# Patient Record
Sex: MALE | Race: WHITE | NOT HISPANIC OR LATINO
[De-identification: names, ages, dates, MRNs, and addresses within clinical notes are randomized per-mention and may not be internally consistent; named-entity substitution may affect disease eponyms.]

---

## 2020-09-15 PROBLEM — Z00.00 ENCOUNTER FOR PREVENTIVE HEALTH EXAMINATION: Status: ACTIVE | Noted: 2020-09-15

## 2020-09-16 ENCOUNTER — APPOINTMENT (OUTPATIENT)
Dept: GASTROENTEROLOGY | Facility: CLINIC | Age: 50
End: 2020-09-16
Payer: MEDICAID

## 2020-09-16 VITALS
HEIGHT: 68.75 IN | OXYGEN SATURATION: 98 % | WEIGHT: 146 LBS | TEMPERATURE: 98.1 F | BODY MASS INDEX: 21.62 KG/M2 | DIASTOLIC BLOOD PRESSURE: 73 MMHG | SYSTOLIC BLOOD PRESSURE: 122 MMHG | HEART RATE: 66 BPM

## 2020-09-16 DIAGNOSIS — Z01.818 ENCOUNTER FOR OTHER PREPROCEDURAL EXAMINATION: ICD-10-CM

## 2020-09-16 DIAGNOSIS — Z78.9 OTHER SPECIFIED HEALTH STATUS: ICD-10-CM

## 2020-09-16 DIAGNOSIS — Z80.9 FAMILY HISTORY OF MALIGNANT NEOPLASM, UNSPECIFIED: ICD-10-CM

## 2020-09-16 DIAGNOSIS — Z12.11 ENCOUNTER FOR SCREENING FOR MALIGNANT NEOPLASM OF COLON: ICD-10-CM

## 2020-09-16 DIAGNOSIS — K62.5 HEMORRHAGE OF ANUS AND RECTUM: ICD-10-CM

## 2020-09-16 DIAGNOSIS — Z82.49 FAMILY HISTORY OF ISCHEMIC HEART DISEASE AND OTHER DISEASES OF THE CIRCULATORY SYSTEM: ICD-10-CM

## 2020-09-16 PROCEDURE — 99203 OFFICE O/P NEW LOW 30 MIN: CPT

## 2020-09-16 RX ORDER — POLYETHYLENE GLYCOL 3350, SODIUM CHLORIDE, SODIUM BICARBONATE AND POTASSIUM CHLORIDE WITH LEMON FLAVOR 420; 11.2; 5.72; 1.48 G/4L; G/4L; G/4L; G/4L
420 POWDER, FOR SOLUTION ORAL
Qty: 1 | Refills: 0 | Status: ACTIVE | COMMUNITY
Start: 2020-09-16 | End: 1900-01-01

## 2020-09-16 NOTE — HISTORY OF PRESENT ILLNESS
[None] : had no significant interval events [Heartburn] : denies heartburn [Nausea] : denies nausea [Vomiting] : denies vomiting [Diarrhea] : denies diarrhea [Yellow Skin Or Eyes (Jaundice)] : denies jaundice [Constipation] : denies constipation [Abdominal Swelling] : denies abdominal swelling [Abdominal Pain] : denies abdominal pain [Wt Loss ___ Lbs] : recent [unfilled] ~Upound(s) weight loss [Rectal Pain] : denies rectal pain [Alcohol Abuse] : alcohol abuse [GERD] : no gastroesophageal reflux disease [Wt Gain ___ Lbs] : no recent weight gain [Hiatus Hernia] : no hiatus hernia [Peptic Ulcer Disease] : no peptic ulcer disease [Pancreatitis] : no pancreatitis [Kidney Stone] : no kidney stone [Cholelithiasis] : no cholelithiasis [Inflammatory Bowel Disease] : no inflammatory bowel disease [Irritable Bowel Syndrome] : no irritable bowel syndrome [Malignancy] : no malignancy [Diverticulitis] : no diverticulitis [Appendectomy] : no appendectomy [Abdominal Surgery] : no abdominal surgery [Cholecystectomy] : no cholecystectomy [de-identified] : The patient is a 50-year-old white male with no significant past medical history who was referred to my office by Dr. Delroy Quinonez for occasional lower GI bleeding. The patient also admits to coming to the office for colon cancer screening. I was asked to render an opinion for consultation for the above complaints.   The patient states that he is feeling fine.  The patient denies any prior exposure to hepatitis A, B or C.  The patient denies any large doses of nonsteroidal anti-inflammatory drugs or acetaminophen.   The patient denies sharing needles, razors, nail clippers, nail files, scissors, et cetera.   The patient admits to EtOH use but denies any cocaine use and intravenous drug use.  The patient denies any prior blood transfusions, sexual indiscretions, tattoos or piercing.  The patient admits to having prior surgery.  The history of surgery is significant for a prior tonsillectomy.   The patient admits to having tattoos  but denies any  piercing.   The patient denies any family history of GI or liver problems.   The patient denies any abdominal pain.  The patient denies any abdominal gas and bloating.  The patient denies any nausea or vomiting.  The patient denies any gastroesophageal reflux disease or dysphagia. The patient denies any atypical chest pain, shortness of breath or palpitations.  The patient denies any diaphoresis. The patient denies any constipation or diarrhea.  The patient has 1 bowel movement a day.  The patient denies a change in bowel habits.  The patient denies a change in caliber of stool.  The patient denies having mucus discharge with the bowel movements.  The patient complains of occasional lower GI bleeding but denies any melena or hematemesis.  The lower GI bleeding is associated with internal hemorrhoids.  The patient denies any rectal pain or rectal pruritus. The patient complains of weight loss but denies any anorexia.  The patient admits to losing 50 pounds over the past 1 year.  He attributed the weight loss to change in diet and exercise.  He denies any fevers or chills.  The patient denies any jaundice or pruritus.  The patient complains of occasional lower back pain.  The patient denies ever having a prior upper endoscopy and colonoscopy performed by another gastroenterologist.  The patient denies any significant family history of GI problems.   [de-identified] : The patient denies any prior exposure to hepatitis A, B or C.  The patient denies any large doses of nonsteroidal anti-inflammatory drugs or acetaminophen.   The patient denies sharing needles, razors, nail clippers, nail files, scissors, et cetera.   The patient admits to EtOH use but denies any cocaine use and intravenous drug use.  The patient denies any prior blood transfusions, sexual indiscretions, tattoos or piercing.  The patient admits to having prior surgery.  The history of surgery is significant for a prior tonsillectomy.   The patient admits to having tattoos  but denies any  piercing.   The patient denies any family history of GI or liver problems.   [de-identified] : (-) smoking, (+) ETOH 2 glasses of wine a day, (-) IVDA\par

## 2020-09-16 NOTE — ASSESSMENT
[FreeTextEntry1] : Rectal Bleeding: The patient had episodes of rectal bleeding.  The etiology of the rectal bleeding is unclear.  I recommend a trial of Anusol HC suppositories one per rectum QHS and Anusol HC 2.5% cream apply to affected area twice a day PRN hemorrhoidal bleeding or pain.  I recommend a trial of Calmoseptine cream apply to affected area twice a day for rectal discomfort. I recommend Tucks pads for the hemorrhoids.  I recommend starting Sitz baths twice a day for the hemorrhoids.  I recommend avoid wearing tight underwear and use boxers. I recommend avoid touching the perineal area. If the symptoms persist, the patient may require a surgical evaluation for possible band ligation of the hemorrhoids with Dr. Jame Slade for possible surgery. \par Colon Cancer screening: I recommend colonoscopy for colon cancer screening over the age of 45 to assess for colonic polyps. The patient was told of the risks and benefits of the procedure.  The patient was told of the risks of perforation, emergency surgery, bleeding, infections and missed lesions. The patient agreed and will schedule for the procedure. The patient is to be n.p.o. after midnight and bowel prep was given.  The patient is to return for the procedure. \par Colonoscopy: I recommend a colonoscopy to assess the symptoms.  The patient was told of the risks and benefits of the procedure.  The patient was told of the risks of perforation, emergency surgery, bleeding, infections and missed lesions.  The patient agreed and will schedule for the procedure. The patient is to be n.p.o. after midnight and bowel prep was given.  The patient is to return for the procedure. \par Follow-up: The patient is to follow-up in the office in 4 weeks to reassess the symptoms. The patient was told to call the office if any further problems. \par \par 
normal...

## 2020-10-10 ENCOUNTER — APPOINTMENT (OUTPATIENT)
Dept: DISASTER EMERGENCY | Facility: CLINIC | Age: 50
End: 2020-10-10

## 2020-10-10 DIAGNOSIS — Z01.818 ENCOUNTER FOR OTHER PREPROCEDURAL EXAMINATION: ICD-10-CM

## 2020-10-11 LAB — SARS-COV-2 N GENE NPH QL NAA+PROBE: NOT DETECTED

## 2020-10-13 ENCOUNTER — APPOINTMENT (OUTPATIENT)
Dept: GASTROENTEROLOGY | Facility: HOSPITAL | Age: 50
End: 2020-10-13

## 2020-10-13 ENCOUNTER — OUTPATIENT (OUTPATIENT)
Dept: OUTPATIENT SERVICES | Facility: HOSPITAL | Age: 50
LOS: 1 days | End: 2020-10-13
Payer: COMMERCIAL

## 2020-10-13 DIAGNOSIS — Z12.11 ENCOUNTER FOR SCREENING FOR MALIGNANT NEOPLASM OF COLON: ICD-10-CM

## 2020-10-13 PROCEDURE — G0121 COLON CA SCRN NOT HI RSK IND: CPT

## 2020-10-13 PROCEDURE — G0121: CPT

## 2020-10-13 NOTE — CHART NOTE - NSCHARTNOTEFT_GEN_A_CORE
Colonoscopy Report:    Indication:          Colon cancer screening  Referring MD:    Dr. Delroy Quinonez  Instrument:        #  Anesthesia:         MAC    Consent:  Informed consent was obtained from the patient after providing any opportunity for questions  Procedure: After placing the patient in the left lateral decubitus position, the colonoscope was gently inserted into the rectum and advanced to the terminal ileum and cecum. Color, texture, mucosa, and anatomy of the colon were carefully examined with the scope. The patient tolerated the procedure well. After completion of the exam, the patient was transferred to the recovery room.     Procedure: Colonoscopy    Preparation: Nulytely (Adequate Prep)    Findings:     Anal Canal:	      Normal appearing anal canal. (+) Small internal hemorrhoids noted on retroflex view.  Rectum:	                    Normal appearing rectal mucosa with no polyps, masses, diverticulosis, AVMs or proctitis noted.  Sigmoid Colon:  	      Normal appearing colonic mucosa with no polyps, masses, diverticulosis, AVMs or colitis noted.  Descending Colon:       Normal appearing colonic mucosa with no polyps, masses, diverticulosis, AVMs or colitis noted.  Splenic Flexure:	      Normal appearing colonic mucosa with no polyps, masses, diverticulosis, AVMs or colitis noted.  Transverse Colon:        Normal appearing colonic mucosa with no polyps, masses, diverticulosis, AVMs or colitis noted.  Hepatic Flexure:	      Normal appearing colonic mucosa with no polyps, masses, diverticulosis, AVMs or colitis noted.  Ascending Colon: 	      Normal appearing colonic mucosa with no polyps, masses, diverticulosis, AVMs or colitis noted.  Cecum:	                    Normal appearing colonic mucosa with no polyps, masses, diverticulosis, AVMs or colitis noted.  Ileo-cecal Valve:	      Normal appearing ileo-cecal valve mucosa with no polyps, masses, AVMs or ileo-colitis noted.  Ileum:                          Normal ileal mucosa with no polyps, masses, diverticulosis, AVMs or ileitis noted.  	    EBL:0    Impression:  1.       A/P:      Procedure Start Time:    Cecum Reached Time:   Procedure End Time:     Total Withdrawal Time:       Attending:       Nirmal Canales M.D. Colonoscopy Report:    Indication:          Colon cancer screening  Referring MD:    Dr. Delroy Quinonez  Instrument:        # [id number]  Anesthesia:         MAC    Consent:  Informed consent was obtained from the patient after providing any opportunity for questions  Procedure: After placing the patient in the left lateral decubitus position, the colonoscope was gently inserted into the rectum and advanced to the cecum. Color, texture, mucosa, and anatomy of the colon were carefully examined with the scope. The patient tolerated the procedure well. After completion of the exam, the patient was transferred to the recovery room.     Procedure: Colonoscopy    Preparation: Nulytely (Adequate Prep)    Findings:     Anal Canal:	      Normal appearing anal canal. (+) Small internal hemorrhoids noted on retroflex view.  Rectum:	                    Normal appearing rectal mucosa with no polyps, masses, diverticulosis, AVMs or proctitis noted.  Sigmoid Colon:  	      Scattered diverticulosis but no polyps, masses, AVMs or colitis noted.  Descending Colon:       Scattered diverticulosis but no polyps, masses, AVMs or colitis noted.  Splenic Flexure:	      Normal appearing colonic mucosa with no polyps, masses, diverticulosis, AVMs or colitis noted.  Transverse Colon:        Normal appearing colonic mucosa with no polyps, masses, diverticulosis, AVMs or colitis noted.  Hepatic Flexure:	      Normal appearing colonic mucosa with no polyps, masses, diverticulosis, AVMs or colitis noted.  Ascending Colon: 	      Scattered diverticulosis but no polyps, masses, AVMs or colitis noted.  Cecum:	                    Normal appearing colonic mucosa with no polyps, masses, diverticulosis, AVMs or colitis noted.  Ileo-cecal Valve:	      Normal appearing ileo-cecal valve mucosa with no polyps, masses, AVMs or ileo-colitis noted.  Ileum:                          Not visualized.  	    EBL:0    Impression:  1. Scattered pandiverticulosis 2. Small internal hemorrhoids    A/P:     1. Recommend a high fiber diet  2. Consider a trial of Anusol HC suppositories one per rectum q.h.s. and Anusol HC 2.5% cream applied to affected area twice a day BID hemorrhoidal bleeding or pain.  3. Recommend a repeat colonoscopy in 10 years to reassess for colonic polyps unless symptomatic.  4. Followup in the office in 4 weeks to reassess symptoms and discussed the findings.      Procedure Start Time:    10:19 am  Cecum Reached Time:    10:26 am  Procedure End Time:      10:33 am  Total Withdrawal Time:    7 Minutes      Attending:       Nirmal Canales M.D.

## 2020-12-18 ENCOUNTER — APPOINTMENT (OUTPATIENT)
Dept: GASTROENTEROLOGY | Facility: CLINIC | Age: 50
End: 2020-12-18
Payer: COMMERCIAL

## 2020-12-18 VITALS
HEART RATE: 66 BPM | BODY MASS INDEX: 23.49 KG/M2 | OXYGEN SATURATION: 98 % | TEMPERATURE: 98.1 F | SYSTOLIC BLOOD PRESSURE: 125 MMHG | HEIGHT: 68 IN | DIASTOLIC BLOOD PRESSURE: 77 MMHG | WEIGHT: 155 LBS

## 2020-12-18 DIAGNOSIS — K57.30 DIVERTICULOSIS OF LARGE INTESTINE W/OUT PERFORATION OR ABSCESS W/OUT BLEEDING: ICD-10-CM

## 2020-12-18 DIAGNOSIS — K64.8 OTHER HEMORRHOIDS: ICD-10-CM

## 2020-12-18 PROCEDURE — 99213 OFFICE O/P EST LOW 20 MIN: CPT

## 2020-12-18 PROCEDURE — 99072 ADDL SUPL MATRL&STAF TM PHE: CPT

## 2020-12-18 NOTE — HISTORY OF PRESENT ILLNESS
[None] : had no significant interval events [Heartburn] : denies heartburn [Nausea] : denies nausea [Vomiting] : denies vomiting [Diarrhea] : denies diarrhea [Constipation] : denies constipation [Yellow Skin Or Eyes (Jaundice)] : denies jaundice [Abdominal Pain] : denies abdominal pain [Abdominal Swelling] : denies abdominal swelling [Rectal Pain] : denies rectal pain [Wt Gain ___ Lbs] : no recent weight gain [Wt Loss ___ Lbs] : no recent weight loss [GERD] : no gastroesophageal reflux disease [Hiatus Hernia] : no hiatus hernia [Peptic Ulcer Disease] : no peptic ulcer disease [Pancreatitis] : no pancreatitis [Cholelithiasis] : no cholelithiasis [Kidney Stone] : no kidney stone [Inflammatory Bowel Disease] : no inflammatory bowel disease [Irritable Bowel Syndrome] : no irritable bowel syndrome [Diverticulitis] : no diverticulitis [Alcohol Abuse] : no alcohol abuse [Malignancy] : no malignancy [Abdominal Surgery] : no abdominal surgery [Appendectomy] : no appendectomy [Cholecystectomy] : no cholecystectomy [de-identified] : The patient denies any prior exposure to hepatitis A, B or C. The patient denies any large doses of nonsteroidal anti-inflammatory drugs or acetaminophen. The patient denies sharing needles, razors, nail clippers, nail files, scissors, et cetera. The patient admits to EtOH use but denies any cocaine use and intravenous drug use. The patient denies any prior blood transfusions, sexual indiscretions, tattoos or piercing. The patient admits to having prior surgery. The history of surgery is significant for a prior tonsillectomy. The patient admits to having tattoos but denies any piercing. The patient denies any family history of GI or liver problems.  The patient states that he is feeling fine. The patient denies any jaundice or pruritus.  The patient denies any chronic lower back pain. The patient denies any abdominal pain.  The patient denies any abdominal gas and bloating.  The patient denies any nausea or vomiting.  The patient denies any gastroesophageal reflux disease or dysphagia.  The patient denies any atypical chest pain, shortness of breath or palpitations.  The patient denies any diaphoresis. The patient denies any constipation or diarrhea.  The patient has 1 bowel movement a day.  The patient denies a change in bowel habits.  The patient denies a change in caliber of stool.  The patient denies having mucus discharge with the bowel movements.  The patient denies any bright red blood per rectum, melena or hematemesis.  The patient denies any rectal pain or rectal pruritus.  The patient denies any weight loss or anorexia.  He denies any fevers or chills.  The patient had a colonoscopy to the cecum performed at the [facility] on October 13, 2020. The colonoscopy to the cecum revealed scattered pandiverticulosis that was primarily concentrated to the left colon and small internal hemorrhoids.  There were no polyps, masses, AVMs or colitis noted.  The patient tolerated the procedure well.  The patient denies any significant family history of GI problems.  [de-identified] : (-) smoking, (-) ETOH, (-) IVDA\par

## 2020-12-18 NOTE — ASSESSMENT
[FreeTextEntry1] : Diverticulosis: I recommend a high-fiber diet and avoid seeds. The patient is to consider a trial of Metamucil once a day for fiber supplementation. The patient is to also consider a trial of a probiotic such as Align once a day. \par Internal Hemorrhoids: The patient is to consider a trial of Anusol H. C. suppositories one per rectum nightly and Anusol HC2 .5% cream apply to affected area twice a day p.r.n. hemorrhoidal bleeding or pain. \par I recommend a repeat colonoscopy in 10 years to reassess for colonic polyps pending patient’s health unless symptomatic.  The patient agreed and will follow up for the procedure. \par Follow-up: The patient is to follow-up in the office in 1 year to reassess the symptoms. The patient was told to call the office if any further problems.

## 2022-02-09 RX ORDER — CLOTRIMAZOLE AND BETAMETHASONE DIPROPIONATE 10; .5 MG/G; MG/G
1-0.05 CREAM TOPICAL TWICE DAILY
Qty: 1 | Refills: 5 | Status: ACTIVE | COMMUNITY
Start: 2022-02-09 | End: 1900-01-01